# Patient Record
Sex: FEMALE | Race: WHITE | NOT HISPANIC OR LATINO
[De-identification: names, ages, dates, MRNs, and addresses within clinical notes are randomized per-mention and may not be internally consistent; named-entity substitution may affect disease eponyms.]

---

## 2017-03-08 ENCOUNTER — FORM ENCOUNTER (OUTPATIENT)
Age: 67
End: 2017-03-08

## 2017-03-09 ENCOUNTER — APPOINTMENT (OUTPATIENT)
Dept: RADIATION ONCOLOGY | Facility: CLINIC | Age: 67
End: 2017-03-09

## 2017-03-09 VITALS
RESPIRATION RATE: 17 BRPM | HEART RATE: 72 BPM | WEIGHT: 124.32 LBS | BODY MASS INDEX: 24.41 KG/M2 | SYSTOLIC BLOOD PRESSURE: 120 MMHG | DIASTOLIC BLOOD PRESSURE: 85 MMHG | OXYGEN SATURATION: 94 % | TEMPERATURE: 96.8 F | HEIGHT: 60 IN

## 2017-06-07 ENCOUNTER — FORM ENCOUNTER (OUTPATIENT)
Age: 67
End: 2017-06-07

## 2017-10-02 ENCOUNTER — APPOINTMENT (OUTPATIENT)
Dept: RADIATION ONCOLOGY | Facility: CLINIC | Age: 67
End: 2017-10-02
Payer: MEDICARE

## 2017-10-02 VITALS
OXYGEN SATURATION: 98 % | HEART RATE: 80 BPM | HEIGHT: 61 IN | WEIGHT: 133.27 LBS | SYSTOLIC BLOOD PRESSURE: 125 MMHG | BODY MASS INDEX: 25.16 KG/M2 | DIASTOLIC BLOOD PRESSURE: 85 MMHG

## 2017-10-02 DIAGNOSIS — R93.5 ABNORMAL FINDINGS ON DIAGNOSTIC IMAGING OF OTHER ABDOMINAL REGIONS, INCLUDING RETROPERITONEUM: ICD-10-CM

## 2017-10-02 PROCEDURE — 99214 OFFICE O/P EST MOD 30 MIN: CPT

## 2017-10-18 ENCOUNTER — FORM ENCOUNTER (OUTPATIENT)
Age: 67
End: 2017-10-18

## 2018-01-23 ENCOUNTER — FORM ENCOUNTER (OUTPATIENT)
Age: 68
End: 2018-01-23

## 2018-04-01 ENCOUNTER — TRANSCRIPTION ENCOUNTER (OUTPATIENT)
Age: 68
End: 2018-04-01

## 2018-04-12 ENCOUNTER — APPOINTMENT (OUTPATIENT)
Dept: RADIATION ONCOLOGY | Facility: CLINIC | Age: 68
End: 2018-04-12
Payer: MEDICARE

## 2018-04-12 VITALS
WEIGHT: 136 LBS | OXYGEN SATURATION: 98 % | DIASTOLIC BLOOD PRESSURE: 93 MMHG | RESPIRATION RATE: 16 BRPM | SYSTOLIC BLOOD PRESSURE: 142 MMHG | BODY MASS INDEX: 25.7 KG/M2 | HEART RATE: 80 BPM

## 2018-04-12 DIAGNOSIS — D18.03 HEMANGIOMA OF INTRA-ABDOMINAL STRUCTURES: ICD-10-CM

## 2018-04-12 PROCEDURE — 99213 OFFICE O/P EST LOW 20 MIN: CPT

## 2018-04-12 RX ORDER — CIPROFLOXACIN HYDROCHLORIDE 500 MG/1
500 TABLET, FILM COATED ORAL
Qty: 4 | Refills: 0 | Status: DISCONTINUED | COMMUNITY
Start: 2017-11-07

## 2018-04-12 RX ORDER — AZITHROMYCIN 250 MG/1
250 TABLET, FILM COATED ORAL
Qty: 6 | Refills: 0 | Status: DISCONTINUED | COMMUNITY
Start: 2018-04-06

## 2018-04-12 RX ORDER — MUPIROCIN 20 MG/G
2 OINTMENT TOPICAL
Qty: 22 | Refills: 0 | Status: DISCONTINUED | COMMUNITY
Start: 2018-03-22

## 2018-04-12 RX ORDER — TRIAMCINOLONE ACETONIDE 1 MG/G
0.1 OINTMENT TOPICAL
Qty: 80 | Refills: 0 | Status: DISCONTINUED | COMMUNITY
Start: 2018-04-05

## 2018-04-12 RX ORDER — DENOSUMAB 60 MG/ML
60 INJECTION SUBCUTANEOUS
Refills: 0 | Status: ACTIVE | COMMUNITY

## 2018-04-16 PROBLEM — D18.03 HEMANGIOMA OF LIVER: Status: ACTIVE | Noted: 2018-04-16

## 2018-07-31 DIAGNOSIS — Z86.79 PERSONAL HISTORY OF OTHER DISEASES OF THE CIRCULATORY SYSTEM: ICD-10-CM

## 2018-07-31 DIAGNOSIS — Z86.59 PERSONAL HISTORY OF OTHER MENTAL AND BEHAVIORAL DISORDERS: ICD-10-CM

## 2018-07-31 DIAGNOSIS — Z86.010 PERSONAL HISTORY OF COLONIC POLYPS: ICD-10-CM

## 2018-07-31 DIAGNOSIS — Z83.3 FAMILY HISTORY OF DIABETES MELLITUS: ICD-10-CM

## 2018-08-03 PROBLEM — Z86.59 HISTORY OF DEPRESSION: Status: RESOLVED | Noted: 2018-08-03 | Resolved: 2018-08-03

## 2018-08-03 PROBLEM — Z83.3 FAMILY HISTORY OF DIABETES MELLITUS: Status: ACTIVE | Noted: 2018-08-03

## 2018-08-03 PROBLEM — Z86.79 HISTORY OF BRADYCARDIA: Status: RESOLVED | Noted: 2018-08-03 | Resolved: 2018-08-03

## 2018-08-03 PROBLEM — Z86.010 HISTORY OF COLONIC POLYPS: Status: RESOLVED | Noted: 2018-08-03 | Resolved: 2018-08-03

## 2018-08-14 ENCOUNTER — FORM ENCOUNTER (OUTPATIENT)
Age: 68
End: 2018-08-14

## 2018-08-15 ENCOUNTER — RECORD ABSTRACTING (OUTPATIENT)
Age: 68
End: 2018-08-15

## 2018-08-15 ENCOUNTER — APPOINTMENT (OUTPATIENT)
Dept: GYNECOLOGIC ONCOLOGY | Facility: CLINIC | Age: 68
End: 2018-08-15

## 2018-08-24 ENCOUNTER — APPOINTMENT (OUTPATIENT)
Dept: GYNECOLOGIC ONCOLOGY | Facility: CLINIC | Age: 68
End: 2018-08-24
Payer: MEDICARE

## 2018-08-24 VITALS — WEIGHT: 130 LBS | BODY MASS INDEX: 24.55 KG/M2 | HEIGHT: 61 IN

## 2018-08-24 PROCEDURE — 99214 OFFICE O/P EST MOD 30 MIN: CPT

## 2018-08-24 RX ORDER — RALOXIFENE HYDROCHLORIDE 60 MG/1
TABLET, FILM COATED ORAL
Refills: 0 | Status: DISCONTINUED | COMMUNITY
End: 2018-08-24

## 2018-10-25 ENCOUNTER — APPOINTMENT (OUTPATIENT)
Dept: RADIATION ONCOLOGY | Facility: CLINIC | Age: 68
End: 2018-10-25
Payer: MEDICARE

## 2018-10-25 VITALS
BODY MASS INDEX: 26.35 KG/M2 | HEIGHT: 61 IN | OXYGEN SATURATION: 97 % | WEIGHT: 139.55 LBS | DIASTOLIC BLOOD PRESSURE: 80 MMHG | SYSTOLIC BLOOD PRESSURE: 121 MMHG | RESPIRATION RATE: 16 BRPM | TEMPERATURE: 98.7 F | HEART RATE: 93 BPM

## 2018-10-25 PROCEDURE — 99213 OFFICE O/P EST LOW 20 MIN: CPT

## 2018-10-25 NOTE — REVIEW OF SYSTEMS
[Urinary Frequency] : urinary frequency [Negative] : Allergic/Immunologic [Constipation: Grade 0] : Constipation: Grade 0 [Diarrhea: Grade 0] : Diarrhea: Grade 0 [Fatigue: Grade 0] : Fatigue: Grade 0 [Hematuria: Grade 0] : Hematuria: Grade 0 [Urinary Urgency: Grade 2 - Limiting instrumental ADL; medical management indicated] : Urinary Urgency: Grade 2 - Limiting instrumental ADL; medical management indicated [Genital Edema: Grade 0] : Genital Edema: Grade 0 [Vaginal Stricture: Grade 1 - Asymptomatic; mild vaginal shortening or narrowing] : Vaginal Stricture: Grade 1 - Asymptomatic; mild vaginal shortening or narrowing [Dermatitis Radiation: Grade 0] : Dermatitis Radiation: Grade 0 [Confused] : no confusion [Dizziness] : no dizziness [de-identified] : eczema recently [FreeTextEntry5] : Seeing urologist predating surgery and radiation [FreeTextEntry8] : not active

## 2018-10-25 NOTE — PHYSICAL EXAM
[General Appearance - In No Acute Distress] : in no acute distress [Abdomen Soft] : soft [Nondistended] : nondistended [Abdomen Tenderness] : non-tender [Normal External Genitalia] : normal external genitalia  [Normal Vaginal Cuff] : vaginal cuff without lesion or nodularity [Cervical Lymph Nodes Enlarged Anterior Bilaterally] : anterior cervical [Supraclavicular Lymph Nodes Enlarged Bilaterally] : supraclavicular [Inguinal Lymph Nodes Enlarged Bilaterally] : inguinal [Normal] : well developed, well nourished, in no acute distress

## 2018-10-25 NOTE — HISTORY OF PRESENT ILLNESS
[FreeTextEntry1] : Vee Mcguire is a 68 year old female with Stage IB grade2 endometrial cancer, s/p comprehensive surgical staging, treated adjuvantly with vaginal brachytherapy treated approximately 2 yrs ago.\par \par She returns today for a follow up visit. Saw Dr. Falcon in 8/2018. Today she feels well. Denies pain to pelvis.., vaginal dryness, vaginal discharge. No changes in stools.  She does report urinary urgency and is using Myrbetriq with some improvement.

## 2018-10-25 NOTE — DISEASE MANAGEMENT
[Pathological] : TNM Stage: p [IB] : IB [FreeTextEntry4] : uterine [TTNM] : 1b [NTNM] : 0 [MTNM] : 0

## 2019-02-07 ENCOUNTER — APPOINTMENT (OUTPATIENT)
Dept: GYNECOLOGIC ONCOLOGY | Facility: CLINIC | Age: 69
End: 2019-02-07
Payer: MEDICARE

## 2019-02-07 ENCOUNTER — TRANSCRIPTION ENCOUNTER (OUTPATIENT)
Age: 69
End: 2019-02-07

## 2019-02-07 VITALS
RESPIRATION RATE: 16 BRPM | OXYGEN SATURATION: 97 % | WEIGHT: 135 LBS | HEART RATE: 62 BPM | SYSTOLIC BLOOD PRESSURE: 126 MMHG | BODY MASS INDEX: 25.49 KG/M2 | HEIGHT: 61 IN | DIASTOLIC BLOOD PRESSURE: 82 MMHG

## 2019-02-07 PROCEDURE — 99213 OFFICE O/P EST LOW 20 MIN: CPT

## 2019-02-07 NOTE — HISTORY OF PRESENT ILLNESS
[FreeTextEntry1] : This 67y/o is here for a routine six month surveillance. Patient feels well from a gynecological stand point. She denies pain or VB. Patient is due for a mammogram in Feb 2019. States Dr. Burrell orders her mammograms and bone density. She is on Prolia. She had a colonoscopy a few years ago and had polyps removed. Pt is on weight watchers and is exercising.

## 2019-02-07 NOTE — REASON FOR VISIT
[FreeTextEntry1] : [facility]\par \par [facility] [phone] at [address] [place] [id number]\par

## 2019-05-02 ENCOUNTER — APPOINTMENT (OUTPATIENT)
Dept: RADIATION ONCOLOGY | Facility: CLINIC | Age: 69
End: 2019-05-02
Payer: MEDICARE

## 2019-05-02 VITALS
WEIGHT: 135.03 LBS | SYSTOLIC BLOOD PRESSURE: 124 MMHG | BODY MASS INDEX: 25.51 KG/M2 | HEART RATE: 87 BPM | OXYGEN SATURATION: 96 % | RESPIRATION RATE: 16 BRPM | DIASTOLIC BLOOD PRESSURE: 82 MMHG

## 2019-05-02 PROCEDURE — 99213 OFFICE O/P EST LOW 20 MIN: CPT

## 2019-05-02 NOTE — REVIEW OF SYSTEMS
[Anal Pain: Grade 0] : Anal Pain: Grade 0 [Constipation: Grade 0] : Constipation: Grade 0 [Diarrhea: Grade 0] : Diarrhea: Grade 0 [Fecal Incontinence: Grade 0] : Fecal Incontinence: Grade 0 [Fatigue: Grade 1 - Fatigue relieved by rest] : Fatigue: Grade 1 - Fatigue relieved by rest [Rectal Pain: Grade 0] : Rectal Pain: Grade 0 [Hematuria: Grade 0] : Hematuria: Grade 0 [Urinary Incontinence: Grade 0] : Urinary Incontinence: Grade 0  [Urinary Tract Pain: Grade 0] : Urinary Tract Pain: Grade 0 [Urinary Urgency: Grade 1 - Present] : Urinary Urgency: Grade 1 - Present [Dyspareunia: Grade 0] : Dyspareunia: Grade 0 [Skin Hyperpigmentation: Grade 0] : Skin Hyperpigmentation: Grade 0 [Dermatitis Radiation: Grade 0] : Dermatitis Radiation: Grade 0

## 2019-05-03 ENCOUNTER — TRANSCRIPTION ENCOUNTER (OUTPATIENT)
Age: 69
End: 2019-05-03

## 2019-05-13 NOTE — HISTORY OF PRESENT ILLNESS
[FreeTextEntry1] : Vee Mcguire is a 68 year old female with Stage IB grade 2 endometrial cancer, s/p comprehensive surgical staging, treated adjuvantly with vaginal brachytherapy treated approximately 3 yrs ago.\par \par She returns today for a follow up visit. Saw BHAVNA Woods on 2/7/19. Today she feels well. Denies pain to pelvis.., vaginal dryness, vaginal discharge. No changes in stools.  She continues to report urinary urgency and is using Myrbetriq with some improvement.

## 2019-05-13 NOTE — PHYSICAL EXAM
[Normal] : well developed, well nourished, in no acute distress [Abdomen Soft] : soft [] : no respiratory distress [Normal External Genitalia] : normal external genitalia  [Abdomen Tenderness] : non-tender [Normal Vaginal Cuff] : vaginal cuff without lesion or nodularity [Supraclavicular Lymph Nodes Enlarged Bilaterally] : supraclavicular [Cervical Lymph Nodes Enlarged Anterior Bilaterally] : anterior cervical [Inguinal Lymph Nodes Enlarged Bilaterally] : inguinal

## 2019-08-08 ENCOUNTER — APPOINTMENT (OUTPATIENT)
Dept: GYNECOLOGIC ONCOLOGY | Facility: CLINIC | Age: 69
End: 2019-08-08
Payer: MEDICARE

## 2019-08-08 VITALS
HEIGHT: 60 IN | HEART RATE: 88 BPM | OXYGEN SATURATION: 97 % | WEIGHT: 135 LBS | DIASTOLIC BLOOD PRESSURE: 83 MMHG | RESPIRATION RATE: 16 BRPM | SYSTOLIC BLOOD PRESSURE: 134 MMHG | BODY MASS INDEX: 26.5 KG/M2

## 2019-08-08 PROCEDURE — 99214 OFFICE O/P EST MOD 30 MIN: CPT

## 2019-08-08 RX ORDER — SIMVASTATIN 10 MG/1
10 TABLET, FILM COATED ORAL
Refills: 0 | Status: ACTIVE | COMMUNITY

## 2019-08-08 NOTE — HISTORY OF PRESENT ILLNESS
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 Landin presents for a routine six month surveillance. Patient feels well from a gynecological stand point. She denies abdominal pain or VB. Reports having a normal mammogram in Feb 2019. States Dr. Burrell orders her mammograms and bone density. She is on Prolia. She had a colonoscopy a few years ago and had polyps removed. Pt discontinued Myrbetriq due to fear of developing a cancer from it. She complains of worsening urinary leakage and urgency. Pt reports being up to date with her physical exams.

## 2019-08-08 NOTE — PHYSICAL EXAM
[Atrophy] : atrophy [Dry Mucosa] : dry mucosa [Absent] : Uterus: Absent [Normal] : Vagina: Normal [de-identified] : no palpable masses  [de-identified] : atrophic mucosa [de-identified] : Allison Jaime was present as chaperone during examination.

## 2019-08-08 NOTE — REASON FOR VISIT
[FreeTextEntry1] : [facility]\par \par [facility] [phone] at [address] [place] [phone]\par

## 2019-10-31 ENCOUNTER — APPOINTMENT (OUTPATIENT)
Dept: RADIATION ONCOLOGY | Facility: CLINIC | Age: 69
End: 2019-10-31
Payer: MEDICARE

## 2019-10-31 VITALS
BODY MASS INDEX: 27.48 KG/M2 | SYSTOLIC BLOOD PRESSURE: 129 MMHG | DIASTOLIC BLOOD PRESSURE: 86 MMHG | HEIGHT: 60 IN | HEART RATE: 70 BPM | RESPIRATION RATE: 17 BRPM | OXYGEN SATURATION: 98 % | WEIGHT: 140 LBS | TEMPERATURE: 98.1 F

## 2019-10-31 PROCEDURE — 99212 OFFICE O/P EST SF 10 MIN: CPT | Mod: GC

## 2019-10-31 NOTE — PHYSICAL EXAM
[Normal External Genitalia] : normal external genitalia  [Normal Vaginal Cuff] : vaginal cuff without lesion or nodularity [Cervical Lymph Nodes Enlarged Anterior Bilaterally] : anterior cervical [Supraclavicular Lymph Nodes Enlarged Bilaterally] : supraclavicular [Inguinal Lymph Nodes Enlarged Bilaterally] : inguinal [] : no respiratory distress [Heart Rate And Rhythm] : heart rate and rhythm were normal [Normal] : normoactive bowel sounds, soft and nontender, no hepatosplenomegaly or masses appreciated

## 2019-11-05 NOTE — REVIEW OF SYSTEMS
[Anal Pain: Grade 0] : Anal Pain: Grade 0 [Constipation: Grade 0] : Constipation: Grade 0 [Diarrhea: Grade 0] : Diarrhea: Grade 0 [Fecal Incontinence: Grade 0] : Fecal Incontinence: Grade 0 [Rectal Pain: Grade 0] : Rectal Pain: Grade 0 [Hematuria: Grade 0] : Hematuria: Grade 0 [Urinary Tract Pain: Grade 0] : Urinary Tract Pain: Grade 0 [Urinary Urgency: Grade 1 - Present] : Urinary Urgency: Grade 1 - Present [Dyspareunia: Grade 0] : Dyspareunia: Grade 0 [Skin Hyperpigmentation: Grade 0] : Skin Hyperpigmentation: Grade 0 [Dermatitis Radiation: Grade 0] : Dermatitis Radiation: Grade 0 [Fatigue: Grade 0] : Fatigue: Grade 0 [Urinary Incontinence: Grade 1 - Occasional (e.g., with coughing, sneezing, etc.), pads not indicated] : Urinary Incontinence: Grade 1 - Occasional (e.g., with coughing, sneezing, etc.), pads not indicated [Negative] : Psychiatric [FreeTextEntry8] : OAB/incontinence [FreeTextEntry2] : stress

## 2019-11-05 NOTE — HISTORY OF PRESENT ILLNESS
[FreeTextEntry1] : Vee Mcguire is a 69 year old female with Stage IB grade 2 endometrial cancer, s/p comprehensive surgical staging, treated adjuvantly with vaginal brachytherapy treated April 2016. She returns today for a follow up visit. She is feeling well. Denies any pain. Reports baseline OAB which worsened after stopping Myrbetriq. She is going to follow up with urogynecologist. No bowel complaints. No vaginal bleeding or discharge.

## 2019-11-18 ENCOUNTER — APPOINTMENT (OUTPATIENT)
Age: 69
End: 2019-11-18
Payer: MEDICARE

## 2019-11-18 ENCOUNTER — RESULT CHARGE (OUTPATIENT)
Age: 69
End: 2019-11-18

## 2019-11-18 VITALS
BODY MASS INDEX: 27.88 KG/M2 | DIASTOLIC BLOOD PRESSURE: 84 MMHG | SYSTOLIC BLOOD PRESSURE: 120 MMHG | WEIGHT: 142 LBS | HEIGHT: 60 IN

## 2019-11-18 DIAGNOSIS — Z87.39 PERSONAL HISTORY OF OTHER DISEASES OF THE MUSCULOSKELETAL SYSTEM AND CONNECTIVE TISSUE: ICD-10-CM

## 2019-11-18 DIAGNOSIS — Z85.40 PERSONAL HISTORY OF MALIGNANT NEOPLASM OF UNSPECIFIED FEMALE GENITAL ORGAN: ICD-10-CM

## 2019-11-18 DIAGNOSIS — Z86.39 PERSONAL HISTORY OF OTHER ENDOCRINE, NUTRITIONAL AND METABOLIC DISEASE: ICD-10-CM

## 2019-11-18 LAB
BILIRUB UR QL STRIP: NEGATIVE
CLARITY UR: CLEAR
COLLECTION METHOD: NORMAL
GLUCOSE UR-MCNC: NEGATIVE
HCG UR QL: 0.2 EU/DL
HGB UR QL STRIP.AUTO: NORMAL
KETONES UR-MCNC: NEGATIVE
LEUKOCYTE ESTERASE UR QL STRIP: NEGATIVE
NITRITE UR QL STRIP: NEGATIVE
PH UR STRIP: 7
PROT UR STRIP-MCNC: NEGATIVE
SP GR UR STRIP: 1.02

## 2019-11-18 PROCEDURE — 99204 OFFICE O/P NEW MOD 45 MIN: CPT | Mod: 25

## 2019-11-18 PROCEDURE — 51701 INSERT BLADDER CATHETER: CPT

## 2019-11-18 RX ORDER — MIRABEGRON 50 MG/1
50 TABLET, FILM COATED, EXTENDED RELEASE ORAL
Refills: 0 | Status: DISCONTINUED | COMMUNITY
End: 2019-11-18

## 2019-11-18 NOTE — DISCUSSION/SUMMARY
[FreeTextEntry1] : \starla Baxter presents with overactive bladder, long history, failed myrbetriq. On exam normal PVR, atrophy. We reviewed her symptoms and exam findings. We discussed management options for overactive bladder including observation, behavorial modifications and bladder training, physical therapy and medications including anticholinergics and beta 3 agonists. We discussed if behavorial modifications and medications fail proceeding with urodynamics to further evaluate her symptoms. We discussed additional treatment options including sacral neuromodulation, PTNS and intra detrusor Botox. IUGA handout on overactive bladder and bladder training PTNS was given to her. She wants to do PTNS and will return. \par \par [] u/a, culture \par [] records from Dr. Domingo\par [] PTNS

## 2019-11-18 NOTE — PHYSICAL EXAM
[No Acute Distress] : in no acute distress [Well developed] : well developed [Well Nourished] : ~L well nourished [Tenderness] : ~T no ~M abdominal tenderness observed [Labia Majora] : were normal [Labia Minora] : were normal [Vulvar Atrophy] : vulvar atrophy [Atrophy] : atrophy [2] : 2 [Absent] : absent [Adnexa Absent] : absent bilaterally [Post Void Residual ____ml] : post void residual via catheterization was [unfilled] ml [Normal] : no abnormalities [de-identified] : no prolapse  [FreeTextEntry3] : neg CST, caruncle

## 2019-11-18 NOTE — HISTORY OF PRESENT ILLNESS
[FreeTextEntry1] : \par 70 y/o reports long standing history of overactive bladder, when she has the urge to go she cannot control, she uses a pad and has to change her pants more then 1 times a day, she reports when she sits she feels like she is done and is not done, she reports leakage of urine with laughing/coughing/sneezing., during the day she voids twice in the am, she feels like she does not have the urge to go, urgency predominant leakage, she reports trying myrbetriq and then stopped, she is unsure if myrbetriq helped, she has not tried other medications, denies hematuria, denies dysuria, denies intermittent stream, denies pelvic pain, denies vaginal bulge, denies vaginal, denies constipation, \par \par she has stage I uterine cancer 4 yrs ago, s/p brachytherapy, no evidence of disease\par \par daily intake: flavored seltzer (8 ounces/day), tomato  juice, apple cider

## 2019-11-18 NOTE — OB HISTORY
[Vaginal ___] : [unfilled] vaginal delivery(s) [unknown] : the patient is unsure of the date of her LMP [Last Pap Smear ___] : date of last pap smear was on [unfilled] [Regular Cycle Intervals] : periods have been irregular [Sexually Active] : is not sexually active

## 2019-11-19 ENCOUNTER — RESULT REVIEW (OUTPATIENT)
Age: 69
End: 2019-11-19

## 2019-11-20 ENCOUNTER — RESULT REVIEW (OUTPATIENT)
Age: 69
End: 2019-11-20

## 2019-11-21 LAB
APPEARANCE: CLEAR
BACTERIA UR CULT: NORMAL
BACTERIA: NEGATIVE
BILIRUBIN URINE: NEGATIVE
BLOOD URINE: ABNORMAL
CALCIUM OXALATE CRYSTALS: ABNORMAL
COLOR: YELLOW
GLUCOSE QUALITATIVE U: NEGATIVE
HYALINE CASTS: 0 /LPF
KETONES URINE: NEGATIVE
LEUKOCYTE ESTERASE URINE: NEGATIVE
MICROSCOPIC-UA: NORMAL
NITRITE URINE: NEGATIVE
PH URINE: 6
PROTEIN URINE: NORMAL
RED BLOOD CELLS URINE: 3 /HPF
SPECIFIC GRAVITY URINE: 1.03
SQUAMOUS EPITHELIAL CELLS: 1 /HPF
URINE COMMENTS: NORMAL
UROBILINOGEN URINE: NORMAL
WHITE BLOOD CELLS URINE: 1 /HPF

## 2019-12-02 ENCOUNTER — APPOINTMENT (OUTPATIENT)
Age: 69
End: 2019-12-02
Payer: MEDICARE

## 2019-12-02 PROCEDURE — 64566 NEUROELTRD STIM POST TIBIAL: CPT

## 2019-12-09 ENCOUNTER — APPOINTMENT (OUTPATIENT)
Age: 69
End: 2019-12-09
Payer: MEDICARE

## 2019-12-09 PROCEDURE — 64566 NEUROELTRD STIM POST TIBIAL: CPT

## 2019-12-16 ENCOUNTER — RESULT CHARGE (OUTPATIENT)
Age: 69
End: 2019-12-16

## 2019-12-16 ENCOUNTER — APPOINTMENT (OUTPATIENT)
Age: 69
End: 2019-12-16
Payer: MEDICARE

## 2019-12-16 VITALS
HEIGHT: 60 IN | WEIGHT: 142 LBS | DIASTOLIC BLOOD PRESSURE: 82 MMHG | BODY MASS INDEX: 27.88 KG/M2 | SYSTOLIC BLOOD PRESSURE: 130 MMHG

## 2019-12-16 DIAGNOSIS — R31.29 OTHER MICROSCOPIC HEMATURIA: ICD-10-CM

## 2019-12-16 LAB
BILIRUB UR QL STRIP: NEGATIVE
CLARITY UR: CLEAR
COLLECTION METHOD: NORMAL
GLUCOSE UR-MCNC: NEGATIVE
HCG UR QL: 0.2 EU/DL
HGB UR QL STRIP.AUTO: NORMAL
KETONES UR-MCNC: NEGATIVE
LEUKOCYTE ESTERASE UR QL STRIP: NEGATIVE
NITRITE UR QL STRIP: NEGATIVE
PH UR STRIP: 5.5
PROT UR STRIP-MCNC: NEGATIVE
SP GR UR STRIP: 1.03

## 2019-12-16 PROCEDURE — 99213 OFFICE O/P EST LOW 20 MIN: CPT | Mod: 25

## 2019-12-16 PROCEDURE — 52000 CYSTOURETHROSCOPY: CPT

## 2019-12-23 ENCOUNTER — APPOINTMENT (OUTPATIENT)
Age: 69
End: 2019-12-23
Payer: MEDICARE

## 2019-12-23 PROCEDURE — 64566 NEUROELTRD STIM POST TIBIAL: CPT

## 2019-12-30 ENCOUNTER — APPOINTMENT (OUTPATIENT)
Age: 69
End: 2019-12-30
Payer: MEDICARE

## 2019-12-30 PROCEDURE — 64566 NEUROELTRD STIM POST TIBIAL: CPT

## 2020-01-06 ENCOUNTER — APPOINTMENT (OUTPATIENT)
Age: 70
End: 2020-01-06
Payer: MEDICARE

## 2020-01-06 PROCEDURE — 64566 NEUROELTRD STIM POST TIBIAL: CPT

## 2020-01-13 ENCOUNTER — APPOINTMENT (OUTPATIENT)
Age: 70
End: 2020-01-13
Payer: MEDICARE

## 2020-01-13 PROCEDURE — 64566 NEUROELTRD STIM POST TIBIAL: CPT

## 2020-01-20 ENCOUNTER — APPOINTMENT (OUTPATIENT)
Age: 70
End: 2020-01-20
Payer: MEDICARE

## 2020-01-20 PROCEDURE — 64566 NEUROELTRD STIM POST TIBIAL: CPT

## 2020-01-27 ENCOUNTER — APPOINTMENT (OUTPATIENT)
Age: 70
End: 2020-01-27
Payer: MEDICARE

## 2020-01-27 PROCEDURE — 64566 NEUROELTRD STIM POST TIBIAL: CPT

## 2020-02-03 ENCOUNTER — APPOINTMENT (OUTPATIENT)
Age: 70
End: 2020-02-03
Payer: MEDICARE

## 2020-02-03 ENCOUNTER — APPOINTMENT (OUTPATIENT)
Dept: GYNECOLOGIC ONCOLOGY | Facility: CLINIC | Age: 70
End: 2020-02-03
Payer: MEDICARE

## 2020-02-03 VITALS
DIASTOLIC BLOOD PRESSURE: 84 MMHG | HEART RATE: 87 BPM | OXYGEN SATURATION: 99 % | HEIGHT: 61 IN | BODY MASS INDEX: 26.81 KG/M2 | WEIGHT: 142 LBS | RESPIRATION RATE: 16 BRPM | SYSTOLIC BLOOD PRESSURE: 134 MMHG

## 2020-02-03 DIAGNOSIS — Z85.42 PERSONAL HISTORY OF MALIGNANT NEOPLASM OF OTHER PARTS OF UTERUS: ICD-10-CM

## 2020-02-03 PROCEDURE — 64566 NEUROELTRD STIM POST TIBIAL: CPT

## 2020-02-03 PROCEDURE — 99214 OFFICE O/P EST MOD 30 MIN: CPT

## 2020-02-03 NOTE — HISTORY OF PRESENT ILLNESS
[FreeTextEntry1] : This 70y/o with history of stage 1B grade 2 adenocarcinoma of the endometrium, s/p RA TLH BSO PPALND, cystoscopy PW in 2/2016 s/p vaginal cuff brachytherapy w/ Dr. Landin presents for a routine six month surveillance. Patient feels well from a gynecological stand point. She denies abdominal pain or VB. Reports having a normal mammogram in Feb 2019. States Dr. Burrell orders her mammograms and bone density. She is on Prolia. She had a colonoscopy a few years ago and had polyps removed. She has been following up with urogynecology and is receiving acupuncture for her overactive bladder which she reports is helping.

## 2020-02-03 NOTE — REASON FOR VISIT
[FreeTextEntry1] : [facility]\par \par [facility] [phone] at [address] [place] [phone]\par

## 2020-02-03 NOTE — PHYSICAL EXAM
[Absent] : Cervix: Absent [Normal] : Bimanual Exam: Normal [de-identified] : no palpable masses [de-identified] : Allison Jaime was present as chaperone during examination.

## 2020-02-04 ENCOUNTER — APPOINTMENT (OUTPATIENT)
Dept: UROGYNECOLOGY | Facility: CLINIC | Age: 70
End: 2020-02-04

## 2020-02-10 ENCOUNTER — APPOINTMENT (OUTPATIENT)
Age: 70
End: 2020-02-10
Payer: MEDICARE

## 2020-02-10 PROCEDURE — 64566 NEUROELTRD STIM POST TIBIAL: CPT

## 2020-02-12 NOTE — ASSESSMENT
I reviewed the progress note and agree with the resident’s findings and plan as documented in current encounter.    Galina Garcia MD       [No evidence of disease] : No evidence of disease

## 2020-02-17 ENCOUNTER — APPOINTMENT (OUTPATIENT)
Dept: UROGYNECOLOGY | Facility: CLINIC | Age: 70
End: 2020-02-17
Payer: MEDICARE

## 2020-02-17 PROCEDURE — 64566 NEUROELTRD STIM POST TIBIAL: CPT

## 2020-03-09 ENCOUNTER — APPOINTMENT (OUTPATIENT)
Age: 70
End: 2020-03-09
Payer: MEDICARE

## 2020-03-09 PROCEDURE — 51798 US URINE CAPACITY MEASURE: CPT

## 2020-03-09 PROCEDURE — 99213 OFFICE O/P EST LOW 20 MIN: CPT | Mod: 25

## 2020-03-09 NOTE — DISCUSSION/SUMMARY
[FreeTextEntry1] : \par Vee presents for reevaluation after 12 sessions of PTNS, she overall has 80% improvement in symptoms but does still change pants. Again discussed other options including botox vs SNS. Botox handout given to her. She will do PTNS maintenance with 1/month, and consider options. Needs UDS prior to botox. All questions were answered.\par \par [] UDS if decides botox\par [] PTNS maintenance 1/month

## 2020-03-09 NOTE — HISTORY OF PRESENT ILLNESS
[FreeTextEntry1] : \par Vee presents for f/u on OAB, she reports 80%improvement with PTNS she is overall happy, when she is emotionally upset she develops leakage, denies nocturia, but in the AM has strong urge, she has decreased coffee intake, occasionally pads are wet she does have to change her pants , she reports her symptoms are much better when she goes to the bathroom every 2 hrs, \par \par she had a CT A/P which was negative for cancer surveillance

## 2020-04-13 ENCOUNTER — APPOINTMENT (OUTPATIENT)
Dept: UROGYNECOLOGY | Facility: CLINIC | Age: 70
End: 2020-04-13

## 2020-04-23 ENCOUNTER — APPOINTMENT (OUTPATIENT)
Dept: RADIATION ONCOLOGY | Facility: CLINIC | Age: 70
End: 2020-04-23
Payer: MEDICARE

## 2020-04-23 PROCEDURE — 99441: CPT

## 2020-04-24 NOTE — REVIEW OF SYSTEMS
Patient would like to know if you can prescribe him something for th MRI.   [Negative] : Gastrointestinal [Anal Pain: Grade 0] : Anal Pain: Grade 0 [Constipation: Grade 0] : Constipation: Grade 0 [Diarrhea: Grade 0] : Diarrhea: Grade 0 [Fecal Incontinence: Grade 0] : Fecal Incontinence: Grade 0 [Rectal Pain: Grade 0] : Rectal Pain: Grade 0 [Fatigue: Grade 0] : Fatigue: Grade 0 [Urinary Tract Pain: Grade 0] : Urinary Tract Pain: Grade 0 [Hematuria: Grade 0] : Hematuria: Grade 0 [Urinary Incontinence: Grade 1 - Occasional (e.g., with coughing, sneezing, etc.), pads not indicated] : Urinary Incontinence: Grade 1 - Occasional (e.g., with coughing, sneezing, etc.), pads not indicated [Dyspareunia: Grade 0] : Dyspareunia: Grade 0 [Skin Hyperpigmentation: Grade 0] : Skin Hyperpigmentation: Grade 0 [Dermatitis Radiation: Grade 0] : Dermatitis Radiation: Grade 0 [Urinary Urgency: Grade 2 - Limiting instrumental ADL; medical management indicated] : Urinary Urgency: Grade 2 - Limiting instrumental ADL; medical management indicated [FreeTextEntry8] : OAB/incontinence [FreeTextEntry2] : stress ,DirectAddress_Unknown

## 2020-04-24 NOTE — DISEASE MANAGEMENT
[Pathological] : TNM Stage: p [IB] : IB [TTNM] : 1b [NTNM] : 0 [FreeTextEntry4] : uterine [MTNM] : 0

## 2020-04-24 NOTE — HISTORY OF PRESENT ILLNESS
[FreeTextEntry1] : Vee Mcguire is a 70 year old female with Stage IB grade 2 endometrial cancer, s/p comprehensive surgical staging, treated adjuvantly with vaginal brachytherapy treated April 2016. She returns today for a follow up visit. via telehealth. She is feeling well. Denies any pain. Reports baseline OAB which worsened after stopping Myrbetriq. She has followed up with urogynecologist and has what she describes as acupunture with some improvement.. No bowel complaints. No vaginal bleeding or discharge.\par \par CT 2/2020 : stable examination. No evidence of metastatic disease in CAP\par !0 minutes spent on phone.

## 2020-08-03 ENCOUNTER — APPOINTMENT (OUTPATIENT)
Dept: GYNECOLOGIC ONCOLOGY | Facility: CLINIC | Age: 70
End: 2020-08-03
Payer: MEDICARE

## 2020-08-03 VITALS
OXYGEN SATURATION: 96 % | HEIGHT: 61 IN | DIASTOLIC BLOOD PRESSURE: 86 MMHG | SYSTOLIC BLOOD PRESSURE: 124 MMHG | HEART RATE: 92 BPM

## 2020-08-03 PROCEDURE — 99214 OFFICE O/P EST MOD 30 MIN: CPT

## 2020-08-03 NOTE — HISTORY OF PRESENT ILLNESS
[FreeTextEntry1] : This 69y/o with history of stage 1B grade 2 adenocarcinoma of the endometrium, s/p RA TLH BSO PPALND, cystoscopy PW in 2/2016 s/p vaginal cuff brachytherapy w/ Dr. Landin presents for a routine six month surveillance. Patient feels well from a gynecological stand point. She denies abdominal pain or VB. She is due for a mammogram and will get RX from Dr. Burrell. She reports being up to date with her DEXA also ordered by Dr. Burrell. She has osteoporosis and is on Prolia. She had a colonoscopy a few years ago and had polyps removed. She plans on following up with urogynecology for overactive bladder and incontinence.  \par \par Ctscan2/2020-stable exam, no evidence of metastatic disease \par

## 2020-08-03 NOTE — REASON FOR VISIT
[FreeTextEntry1] : [facility]\par \par [facility] [phone] at [address] [place] [phone]\par

## 2020-08-03 NOTE — PHYSICAL EXAM
[Absent] : Adnexa(ae): Absent [Normal] : Parametria: Normal [de-identified] : Patient was interviewed and examined with chaperone present. Name of chaperone: Shital Guadarrama

## 2020-08-11 ENCOUNTER — APPOINTMENT (OUTPATIENT)
Age: 70
End: 2020-08-11

## 2020-08-18 ENCOUNTER — APPOINTMENT (OUTPATIENT)
Age: 70
End: 2020-08-18
Payer: MEDICARE

## 2020-08-18 PROCEDURE — 64566 NEUROELTRD STIM POST TIBIAL: CPT

## 2020-08-27 ENCOUNTER — APPOINTMENT (OUTPATIENT)
Age: 70
End: 2020-08-27

## 2020-09-01 ENCOUNTER — APPOINTMENT (OUTPATIENT)
Age: 70
End: 2020-09-01
Payer: MEDICARE

## 2020-09-01 PROCEDURE — 64566 NEUROELTRD STIM POST TIBIAL: CPT

## 2020-09-08 ENCOUNTER — APPOINTMENT (OUTPATIENT)
Age: 70
End: 2020-09-08
Payer: MEDICARE

## 2020-09-08 PROCEDURE — 64566 NEUROELTRD STIM POST TIBIAL: CPT

## 2020-09-14 ENCOUNTER — APPOINTMENT (OUTPATIENT)
Age: 70
End: 2020-09-14
Payer: MEDICARE

## 2020-09-14 ENCOUNTER — RESULT CHARGE (OUTPATIENT)
Age: 70
End: 2020-09-14

## 2020-09-14 VITALS — SYSTOLIC BLOOD PRESSURE: 127 MMHG | DIASTOLIC BLOOD PRESSURE: 82 MMHG

## 2020-09-14 LAB
BILIRUB UR QL STRIP: NEGATIVE
BILIRUB UR QL STRIP: NEGATIVE
CLARITY UR: CLEAR
CLARITY UR: CLEAR
COLLECTION METHOD: NORMAL
COLLECTION METHOD: NORMAL
GLUCOSE UR-MCNC: NEGATIVE
GLUCOSE UR-MCNC: NEGATIVE
HCG UR QL: 0.2 EU/DL
HCG UR QL: 0.2 EU/DL
HGB UR QL STRIP.AUTO: NORMAL
HGB UR QL STRIP.AUTO: NORMAL
KETONES UR-MCNC: NEGATIVE
KETONES UR-MCNC: NEGATIVE
LEUKOCYTE ESTERASE UR QL STRIP: NEGATIVE
LEUKOCYTE ESTERASE UR QL STRIP: NORMAL
NITRITE UR QL STRIP: NEGATIVE
NITRITE UR QL STRIP: NEGATIVE
PH UR STRIP: 6.5
PH UR STRIP: 7
PROT UR STRIP-MCNC: NEGATIVE
PROT UR STRIP-MCNC: NEGATIVE
SP GR UR STRIP: 1.02
SP GR UR STRIP: 1.02

## 2020-09-14 PROCEDURE — 51701 INSERT BLADDER CATHETER: CPT

## 2020-09-14 PROCEDURE — 99214 OFFICE O/P EST MOD 30 MIN: CPT | Mod: 25

## 2020-09-14 NOTE — PROCEDURE
[Urinary Tract Infection] : a urinary tract infection [Patient] : the patient [___ Fr Straight Tip] : a [unfilled] in [place]ut[place] straight tip catheter [Clear] : clear [None] : there were no complications with the catheter insertion [No Complications] : no complications [Culture] : culture [Tolerated Well] : the patient tolerated the procedure well [Post procedure instructions and information given] : Post procedure instructions and information were given and reviewed with patient. [0] : 0

## 2020-09-14 NOTE — DISCUSSION/SUMMARY
[FreeTextEntry1] : \par Vee presents for f/u during PTNS, this time she has no improvement in symptoms and would like to discuss other options. Again discussed other options including botox vs SNS, we did discuss myrbetriq and samples given to the patient incase she wants to try. . Botox handout given to her.  Needs UDS prior to botox. We reviewed her symptoms and overactive bladder including treatment options. We risks/benefits/alternatives to botox were reviewed. We discussed success rates of the procedure. We reviewed the risks of the procedure including but not limited to: UTI, recurrent UTI,  incomplete emptying requiring catheterization, failure of procedure, dysuria, hematuria, bladder pain, painful urination, injury to the bladder, systemic effects like muscle weakness. We discussed the transient effects of botox and need for future injections once effects wear off, if successful and desired by patient. In addition, she was given  information on botox to review. We discussed pre-procedure instructions including antibiotics which start 3 days prior to procedure and continue\par \par All questions were answered.\par \par [] UDS if decides botox\par

## 2020-09-14 NOTE — HISTORY OF PRESENT ILLNESS
[Cystocele (Obstetric)] : no [Vaginal Wall Prolapse] : no [Rectal Prolapse] : no [Unable To Restrain Bowel Movement] : severe [x3+] : nocturia three or more  times a night [Incomplete Emptying Of Bladder] : no [Urinary Frequency] : no [Feelings Of Urinary Urgency] : no [Pain During Urination (Dysuria)] : no [Urinary Tract Infection] : severe [] : no [Constipation Obstructed Defecation] : mild [Pelvic Pain] : no [Incomplete Emptying Of Stool] : no [Vaginal Pain] : no [FreeTextEntry1] : \par Vee presents for f/u on OAB, she initially had improvement in OAB symptoms  with PTNS and now she restarted PTNS with no improvement in symptoms, reports constantly voiding throughout the day, large leaks with urge and nocturia, she would like to discuss other options for OAB\par \par \par she had a CT A/P which was negative for cancer surveillance\par and cysto 1 yr ago which was negative

## 2020-09-14 NOTE — PHYSICAL EXAM
[Chaperone Present] : A chaperone was present in the examining room during all aspects of the physical examination [Vulvar Atrophy] : vulvar atrophy [Normal] : no abnormalities [Atrophy] : atrophy [FreeTextEntry1] : General: Well, appearing. Alert and orientated. No acute distress\par HEENT: Normocephalic, atraumatic and extraocular muscles appear to be intact \par Neck: Full range of motion, no obvious lymphadenopathy, deformities, or masses noted \par Respiratory: Speaking in full sentences comfortably, normal work of breathing and no cough during visit\par Musculoskeletal: active full range of motion in extremities \par Extremities: No upper extremity edema noted\par Skin: no obvious rash or skin lesions\par Neuro: Orientated X 3, speech is fluent, normal rate\par Psych: Normal mood and affect \par \par  [FreeTextEntry3] : caruncle

## 2020-09-15 LAB
APPEARANCE: CLEAR
BACTERIA: NEGATIVE
BILIRUBIN URINE: NEGATIVE
BLOOD URINE: NEGATIVE
COLOR: YELLOW
GLUCOSE QUALITATIVE U: NEGATIVE
HYALINE CASTS: 0 /LPF
KETONES URINE: NEGATIVE
LEUKOCYTE ESTERASE URINE: NEGATIVE
MICROSCOPIC-UA: NORMAL
NITRITE URINE: NEGATIVE
PH URINE: 6.5
PROTEIN URINE: NORMAL
RED BLOOD CELLS URINE: 5 /HPF
SPECIFIC GRAVITY URINE: 1.02
SQUAMOUS EPITHELIAL CELLS: 1 /HPF
UROBILINOGEN URINE: NORMAL
WHITE BLOOD CELLS URINE: 0 /HPF

## 2020-09-17 LAB — BACTERIA UR CULT: NORMAL

## 2020-09-30 ENCOUNTER — APPOINTMENT (OUTPATIENT)
Age: 70
End: 2020-09-30
Payer: MEDICARE

## 2020-09-30 PROCEDURE — 51784 ANAL/URINARY MUSCLE STUDY: CPT

## 2020-09-30 PROCEDURE — 51797 INTRAABDOMINAL PRESSURE TEST: CPT

## 2020-09-30 PROCEDURE — 51741 ELECTRO-UROFLOWMETRY FIRST: CPT

## 2020-09-30 PROCEDURE — 51729 CYSTOMETROGRAM W/VP&UP: CPT

## 2020-10-20 ENCOUNTER — RESULT CHARGE (OUTPATIENT)
Age: 70
End: 2020-10-20

## 2020-10-20 ENCOUNTER — APPOINTMENT (OUTPATIENT)
Dept: UROGYNECOLOGY | Facility: CLINIC | Age: 70
End: 2020-10-20
Payer: MEDICARE

## 2020-10-20 LAB
BILIRUB UR QL STRIP: NEGATIVE
CLARITY UR: CLEAR
COLLECTION METHOD: NORMAL
GLUCOSE UR-MCNC: NEGATIVE
HCG UR QL: 0.2 EU/DL
HGB UR QL STRIP.AUTO: NORMAL
KETONES UR-MCNC: NORMAL
LEUKOCYTE ESTERASE UR QL STRIP: NEGATIVE
NITRITE UR QL STRIP: NEGATIVE
PH UR STRIP: 5.5
PROT UR STRIP-MCNC: NEGATIVE
SP GR UR STRIP: 1.03

## 2020-10-20 PROCEDURE — 52287 CYSTOSCOPY CHEMODENERVATION: CPT

## 2020-11-10 ENCOUNTER — APPOINTMENT (OUTPATIENT)
Dept: RADIATION ONCOLOGY | Facility: CLINIC | Age: 70
End: 2020-11-10
Payer: MEDICARE

## 2020-11-10 VITALS
OXYGEN SATURATION: 98 % | DIASTOLIC BLOOD PRESSURE: 90 MMHG | SYSTOLIC BLOOD PRESSURE: 130 MMHG | HEART RATE: 79 BPM | RESPIRATION RATE: 16 BRPM | WEIGHT: 147.6 LBS | BODY MASS INDEX: 27.89 KG/M2 | TEMPERATURE: 96.8 F

## 2020-11-10 PROCEDURE — 99212 OFFICE O/P EST SF 10 MIN: CPT

## 2020-11-10 RX ORDER — MIRABEGRON 50 MG/1
50 TABLET, FILM COATED, EXTENDED RELEASE ORAL
Qty: 30 | Refills: 2 | Status: DISCONTINUED | COMMUNITY
Start: 2020-09-14 | End: 2020-11-10

## 2020-11-10 RX ORDER — CIPROFLOXACIN HYDROCHLORIDE 250 MG/1
250 TABLET, FILM COATED ORAL
Qty: 10 | Refills: 0 | Status: DISCONTINUED | COMMUNITY
Start: 2020-10-15 | End: 2020-11-10

## 2020-11-10 RX ORDER — MIRTAZAPINE 30 MG/1
TABLET, FILM COATED ORAL
Refills: 0 | Status: ACTIVE | COMMUNITY

## 2020-11-10 NOTE — REVIEW OF SYSTEMS
[Constipation: Grade 0] : Constipation: Grade 0 [Diarrhea: Grade 0] : Diarrhea: Grade 0 [Fatigue: Grade 0] : Fatigue: Grade 0 [Urinary Retention: Grade 0] : Urinary Retention: Grade 0 [Urinary Tract Pain: Grade 0] : Urinary Tract Pain: Grade 0 [Urinary Urgency: Grade 0] : Urinary Urgency: Grade 0 [Urinary Incontinence: Grade 2 - Spontaneous; pads indicated; limiting instrumental ADL] : Urinary Incontinence: Grade 2 - Spontaneous; pads indicated; limiting instrumental ADL [FreeTextEntry9] : OAB

## 2020-11-10 NOTE — HISTORY OF PRESENT ILLNESS
[FreeTextEntry1] : Vee Mcguire is a 70 year old female with Stage IB grade 2 endometrial cancer, s/p comprehensive surgical staging, treated adjuvantly with vaginal brachytherapy treated April 2016. She returns today for a follow up visit. via telehealth. She is feeling well. Denies any pain. Reports baseline OAB which worsened after stopping Myrbetriq. She has followed up with urogynecologist and has what she describes as acupunture with some improvement.. No bowel complaints. No vaginal bleeding or discharge.\par \par CT 2/2020 : stable examination. No evidence of metastatic disease in CAP. \par \par She underwent Botox injections for her overactive bladder which has not made asignificant change

## 2020-11-13 ENCOUNTER — APPOINTMENT (OUTPATIENT)
Dept: UROGYNECOLOGY | Facility: CLINIC | Age: 70
End: 2020-11-13
Payer: MEDICARE

## 2020-11-13 ENCOUNTER — RESULT CHARGE (OUTPATIENT)
Age: 70
End: 2020-11-13

## 2020-11-13 PROCEDURE — 99213 OFFICE O/P EST LOW 20 MIN: CPT

## 2020-11-13 NOTE — PROCEDURE
[Straight Catheterization] : insertion of a straight catheter [Urinary Tract Infection] : a urinary tract infection [Patient] : the patient [___ Fr Straight Tip] : a [unfilled] in [place] straight tip catheter [Clear] : clear [Culture] : culture [No Complications] : no complications [Tolerated Well] : the patient tolerated the procedure well [Post procedure instructions and information given] : Post procedure instructions and information were given and reviewed with patient. [0] : 0

## 2020-11-13 NOTE — PHYSICAL EXAM
[Chaperone Present] : A chaperone was present in the examining room during all aspects of the physical examination [FreeTextEntry1] : General: Well, appearing. Alert and orientated. No acute distress\par HEENT: Normocephalic, atraumatic and extraocular muscles appear to be intact \par Neck: Full range of motion, no obvious lymphadenopathy, deformities, or masses noted \par Respiratory: Speaking in full sentences comfortably, normal work of breathing and no cough during visit\par Musculoskeletal: active full range of motion in extremities \par Extremities: No upper extremity edema noted\par Skin: no obvious rash or skin lesions\par Neuro: Orientated X 3, speech is fluent, normal rate\par Psych: Normal mood and affect \par \par  [Normal] : no abnormalities [Post Void Residual ____ml] : post void residual was [unfilled] ml

## 2020-11-13 NOTE — DISCUSSION/SUMMARY
[FreeTextEntry1] : \par Vee is 3 wks s/p intradetrusor botox, no significant improvement in symptoms, will give more time, discussed hard to treat with h/o radiations, if no improvement can consider repeat injections

## 2020-11-13 NOTE — HISTORY OF PRESENT ILLNESS
[Cystocele (Obstetric)] : no [Vaginal Wall Prolapse] : no [Rectal Prolapse] : no [Unable To Restrain Bowel Movement] : moderate [Urinary Frequency More Than Twice At Night (Nocturia)] : no nocturia [Incomplete Emptying Of Bladder] : no [Urinary Frequency] : no [Feelings Of Urinary Urgency] : no [Pain During Urination (Dysuria)] : no [Urinary Tract Infection] : severe [Constipation Obstructed Defecation] : mild [] : no [Incomplete Emptying Of Stool] : no [Pelvic Pain] : no [Vaginal Pain] : no [FreeTextEntry1] : \starla Baxter presents 3 wks s/p intradetrusor botox, reports small but not significant improvement in symptoms, denies incomplete emptying, was seen by Dr. Landin

## 2021-01-06 ENCOUNTER — APPOINTMENT (OUTPATIENT)
Dept: GYNECOLOGIC ONCOLOGY | Facility: CLINIC | Age: 71
End: 2021-01-06
Payer: MEDICARE

## 2021-01-06 VITALS
HEIGHT: 61 IN | OXYGEN SATURATION: 96 % | RESPIRATION RATE: 16 BRPM | HEART RATE: 87 BPM | DIASTOLIC BLOOD PRESSURE: 80 MMHG | WEIGHT: 140 LBS | BODY MASS INDEX: 26.43 KG/M2 | SYSTOLIC BLOOD PRESSURE: 124 MMHG

## 2021-01-06 PROCEDURE — 99213 OFFICE O/P EST LOW 20 MIN: CPT

## 2021-01-06 NOTE — END OF VISIT
[FreeTextEntry3] : Written by Ivette Waggoner, acting as a scribe for Dr. Hesham Falcon.\par This note accurately reflects the work and decisions made by me\par

## 2021-01-06 NOTE — PHYSICAL EXAM
[Absent] : Adnexa(ae): Absent [Normal] : Bimanual Exam: Normal [de-identified] : Ivette Waggoner MA was present the entire time of gynecological exam.

## 2021-01-06 NOTE — REASON FOR VISIT
[FreeTextEntry1] : [facility]\par \par [facility] [phone] at [address] [place] [phone]\par

## 2021-01-06 NOTE — ASSESSMENT
[FreeTextEntry1] : Clinically GLENN\par \par Patient had questions in regards to urine incontinence in relation to her history of brachytherapy.  I explained that the brachytherapy may be why current therapies she has tried including botox are not helping. I am recommending a consultation for hyperbaric at [facility] for possible management of urine incontinence. Patient will consider and talk to her other providers. \par \par I discussed with the patient that she is now approximately five years from her gynecological cancer diagnosis.  I explained to the patient that, while the cure rate for any gynecological cancer is not 100%, the risk of her cancer recurring after five years is very small.  I explained that it is our routine for patients to be discharged from my practice at this important milestone and the importance of routine gynecological examinations with her gynecologist was discussed.  The signs and symptoms of recurrence were reviewed and the patient knows to return to see me if she has any of these or any other concerns.\par \par

## 2021-01-06 NOTE — HISTORY OF PRESENT ILLNESS
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 Landin. Patient presents today for a six month surveillance visit. Today is her 5 year visit. She feels well from a gynecological stand point. She denies abdominal pain or VB. She has osteoporosis managed with Prolia. CT from 2/2020 was GLENN. She is under the care of uro-gyn for urge incontinence and is s/p intadetrusor botox with Dr. Herr in 10/2020.

## 2021-01-11 ENCOUNTER — RESULT CHARGE (OUTPATIENT)
Age: 71
End: 2021-01-11

## 2021-01-11 ENCOUNTER — APPOINTMENT (OUTPATIENT)
Dept: UROGYNECOLOGY | Facility: CLINIC | Age: 71
End: 2021-01-11
Payer: MEDICARE

## 2021-01-11 LAB
BILIRUB UR QL STRIP: NEGATIVE
CLARITY UR: CLEAR
COLLECTION METHOD: NORMAL
GLUCOSE UR-MCNC: NEGATIVE
HCG UR QL: 0.2 EU/DL
HGB UR QL STRIP.AUTO: NORMAL
KETONES UR-MCNC: NEGATIVE
LEUKOCYTE ESTERASE UR QL STRIP: NORMAL
NITRITE UR QL STRIP: NEGATIVE
PH UR STRIP: 6
PROT UR STRIP-MCNC: NEGATIVE
SP GR UR STRIP: 1.03

## 2021-01-11 PROCEDURE — 99214 OFFICE O/P EST MOD 30 MIN: CPT

## 2021-01-11 NOTE — HISTORY OF PRESENT ILLNESS
[Cystocele (Obstetric)] : no [Vaginal Wall Prolapse] : no [Rectal Prolapse] : no [Unable To Restrain Bowel Movement] : moderate [Urinary Frequency More Than Twice At Night (Nocturia)] : no nocturia [Incomplete Emptying Of Bladder] : no [Urinary Frequency] : no [Feelings Of Urinary Urgency] : no [Pain During Urination (Dysuria)] : no [Urinary Tract Infection] : moderate [Constipation Obstructed Defecation] : mild [] : no [Incomplete Emptying Of Stool] : no [Pelvic Pain] : no [Vaginal Pain] : no [de-identified] : voids 4 times during the day  [FreeTextEntry1] : \par Vee presents for f/u on OAB and LAINE she is s/p intradetrusor botox with minimal relief 3 months ago, she has had negative cystoscopy, she is not on WW and was drinking a lot of grape juice, she reports sometimes the leakage just happens, she voids 4 times during the day, 0 times at night  , she reports leakage with laughing/coughing, she reports going through multiple clothes/day, she reports her clothes are wet \par \par UDS: LAINE/ISD and DO\par cystoscopy

## 2021-01-11 NOTE — DISCUSSION/SUMMARY
[FreeTextEntry1] : \par Vee presents with DO and LAINE/ISD. She reports leakage throughout the day, she does not have increase in frequency, no nocturia, suspect a lot of leakage is due to LAINE. Discussed options, she does not want surgery, discussed urethral bulking. Risks/benefits discussed. IUGA urethral bulking given and she would like to get it. sucesss rates reviewed. aware may need botox/bulking combination therapy to control symptoms\par \par [] coaptite

## 2021-01-11 NOTE — REASON FOR VISIT
[Follow-Up Visit_____] : a follow-up visit for [unfilled] [Questionnaire Received] : Patient questionnaire received [Intake Form Reviewed] : Patient intake form with past medical history, surgical history, family history and social history reviewed today

## 2021-02-04 ENCOUNTER — APPOINTMENT (OUTPATIENT)
Age: 71
End: 2021-02-04

## 2021-02-25 ENCOUNTER — APPOINTMENT (OUTPATIENT)
Age: 71
End: 2021-02-25
Payer: MEDICARE

## 2021-02-25 PROCEDURE — 51715 ENDOSCOPIC INJECTION/IMPLANT: CPT

## 2021-03-11 ENCOUNTER — APPOINTMENT (OUTPATIENT)
Dept: UROGYNECOLOGY | Facility: CLINIC | Age: 71
End: 2021-03-11
Payer: MEDICARE

## 2021-03-11 PROCEDURE — 51798 US URINE CAPACITY MEASURE: CPT

## 2021-03-11 PROCEDURE — 99212 OFFICE O/P EST SF 10 MIN: CPT

## 2021-03-11 NOTE — HISTORY OF PRESENT ILLNESS
[FreeTextEntry1] : Pt is happy 2 wks s/p periurethral bulking procedure.  Report 80% improvement in urine leakage.  Feels like she is emptying her bladder fully.  PVR via bladder scan= 2cc.  Will call or RTO as needed.  Verbalizes understanding.

## 2021-05-27 ENCOUNTER — APPOINTMENT (OUTPATIENT)
Dept: RADIATION ONCOLOGY | Facility: CLINIC | Age: 71
End: 2021-05-27
Payer: MEDICARE

## 2021-05-27 VITALS
RESPIRATION RATE: 17 BRPM | DIASTOLIC BLOOD PRESSURE: 87 MMHG | HEART RATE: 86 BPM | OXYGEN SATURATION: 96 % | SYSTOLIC BLOOD PRESSURE: 124 MMHG

## 2021-05-27 DIAGNOSIS — C55 MALIGNANT NEOPLASM OF UTERUS, PART UNSPECIFIED: ICD-10-CM

## 2021-05-27 PROCEDURE — 99212 OFFICE O/P EST SF 10 MIN: CPT

## 2021-06-13 NOTE — REVIEW OF SYSTEMS
[Constipation: Grade 0] : Constipation: Grade 0 [Diarrhea: Grade 0] : Diarrhea: Grade 0 [Fatigue: Grade 0] : Fatigue: Grade 0 [Urinary Incontinence: Grade 2 - Spontaneous; pads indicated; limiting instrumental ADL] : Urinary Incontinence: Grade 2 - Spontaneous; pads indicated; limiting instrumental ADL [Urinary Retention: Grade 0] : Urinary Retention: Grade 0 [Urinary Tract Pain: Grade 0] : Urinary Tract Pain: Grade 0 [Urinary Urgency: Grade 0] : Urinary Urgency: Grade 0 [FreeTextEntry9] : OAB

## 2021-06-13 NOTE — PHYSICAL EXAM
[No Rectal Mass] : no rectal mass [Normal External Genitalia] : normal external genitalia  [Normal Vaginal Cuff] : vaginal cuff without lesion or nodularity [Supraclavicular Lymph Nodes Enlarged Bilaterally] : supraclavicular [Inguinal Lymph Nodes Enlarged Bilaterally] : inguinal

## 2021-06-13 NOTE — HISTORY OF PRESENT ILLNESS
[FreeTextEntry1] : Vee Mcguire is a 71 year old female with Stage IB grade 2 endometrial cancer, s/p comprehensive surgical staging, treated adjuvantly with vaginal brachytherapy treated April 2016. She returns today for a follow up visit. via telehealth. She is feeling well. Denies any pain. Reports baseline OAB which worsened after stopping Myrbetriq. She has followed up with urogynecologist and has what she describes as acupunture with some improvement.. No bowel complaints. No vaginal bleeding or discharge.\par \par CT 2/2020 : stable examination. No evidence of metastatic disease in CAP. \par She is s/p periurethral bulking procedure with improvement in urine leakage. \par \par 5/27/21 - Pt feels well.  No complaints of pain or vaginal bleeding.  No vaginal discharge. Pt has a over active bladder bowels are functioning well.

## 2021-07-15 ENCOUNTER — APPOINTMENT (OUTPATIENT)
Age: 71
End: 2021-07-15
Payer: MEDICARE

## 2021-07-15 DIAGNOSIS — R32 UNSPECIFIED URINARY INCONTINENCE: ICD-10-CM

## 2021-07-15 PROCEDURE — L8606: CPT

## 2021-07-15 PROCEDURE — 51715 ENDOSCOPIC INJECTION/IMPLANT: CPT

## 2021-08-05 ENCOUNTER — APPOINTMENT (OUTPATIENT)
Age: 71
End: 2021-08-05
Payer: MEDICARE

## 2021-08-05 PROCEDURE — 99213 OFFICE O/P EST LOW 20 MIN: CPT

## 2021-08-05 NOTE — PHYSICAL EXAM
[Chaperone Present] : A chaperone was present in the examining room during all aspects of the physical examination [FreeTextEntry1] : General: Well, appearing. Alert and orientated. No acute distress\par HEENT: Normocephalic, atraumatic and extraocular muscles appear to be intact \par Neck: Full range of motion, no obvious lymphadenopathy, deformities, or masses noted \par Respiratory: Speaking in full sentences comfortably, normal work of breathing and no cough during visit\par Musculoskeletal: active full range of motion in extremities \par Extremities: No upper extremity edema noted\par Skin: no obvious rash or skin lesions\par Neuro: Orientated X 3, speech is fluent, normal rate\par Psych: Normal mood and affect \par \par PVR 100cc today by bladder scan 45 min after void

## 2021-08-05 NOTE — DISCUSSION/SUMMARY
[FreeTextEntry1] : \starla Vee presents for f/u on HEIDI, treatment for LAINE done, now desires repeat botox injections, will return, all questions answered.

## 2021-08-05 NOTE — HISTORY OF PRESENT ILLNESS
[FreeTextEntry1] : \starla Vee has a history of HEIDI, she has undergone both urethral bulking and botox, she has had a good response to both\par last bulking 2 wks ago and reports improvement in LAINE complaints however now reporting UUI complaints, unable to hold with urgency, frequency, she feels like the botox has wore off and thinks she needs more

## 2021-09-13 ENCOUNTER — APPOINTMENT (OUTPATIENT)
Age: 71
End: 2021-09-13
Payer: MEDICARE

## 2021-09-13 PROCEDURE — 52287 CYSTOSCOPY CHEMODENERVATION: CPT

## 2021-10-05 ENCOUNTER — RESULT CHARGE (OUTPATIENT)
Age: 71
End: 2021-10-05

## 2021-10-05 ENCOUNTER — APPOINTMENT (OUTPATIENT)
Age: 71
End: 2021-10-05
Payer: MEDICARE

## 2021-10-05 VITALS — SYSTOLIC BLOOD PRESSURE: 136 MMHG | DIASTOLIC BLOOD PRESSURE: 88 MMHG | TEMPERATURE: 98.6 F

## 2021-10-05 DIAGNOSIS — R32 UNSPECIFIED URINARY INCONTINENCE: ICD-10-CM

## 2021-10-05 LAB
BILIRUB UR QL STRIP: NORMAL
GLUCOSE UR-MCNC: NORMAL
HCG UR QL: 0.2 EU/DL
HGB UR QL STRIP.AUTO: NORMAL
KETONES UR-MCNC: NORMAL
LEUKOCYTE ESTERASE UR QL STRIP: NORMAL
NITRITE UR QL STRIP: NORMAL
PH UR STRIP: 7
PROT UR STRIP-MCNC: NORMAL
SP GR UR STRIP: 1.02

## 2021-10-05 PROCEDURE — 99212 OFFICE O/P EST SF 10 MIN: CPT

## 2021-10-05 NOTE — PHYSICAL EXAM
[No Acute Distress] : in no acute distress [Well developed] : well developed [Well Nourished] : ~L well nourished [Good Hygeine] : demonstrates good hygeine [Post Void Residual ____ml] : post void residual was [unfilled] ml

## 2021-10-07 NOTE — HISTORY OF PRESENT ILLNESS
[FreeTextEntry1] : 70 y/o female arrived today post botox injection state she has been doing well. this is Vee 2nd intradetrusor botox pt is also s/p bulking x 2. Vee denies leakage of urine with laugh cough or sneeze , + ugency , + urge incontinence.

## 2021-10-07 NOTE — DISCUSSION/SUMMARY
[FreeTextEntry1] : gurinder and i discussed timed voids , importance of setting an alarm and not waiting too long to void , she open and will try this so she does not wait too long to void then has an accident . she understands and will try . she is happy with the outcome of her injections.\par call if need arises \par call if increase in OAB symptoms

## 2021-11-12 ENCOUNTER — APPOINTMENT (OUTPATIENT)
Age: 71
End: 2021-11-12
Payer: MEDICARE

## 2021-11-12 ENCOUNTER — RESULT CHARGE (OUTPATIENT)
Age: 71
End: 2021-11-12

## 2021-11-12 DIAGNOSIS — N39.3 STRESS INCONTINENCE (FEMALE) (MALE): ICD-10-CM

## 2021-11-12 DIAGNOSIS — R35.0 FREQUENCY OF MICTURITION: ICD-10-CM

## 2021-11-12 LAB
BILIRUB UR QL STRIP: NEGATIVE
CLARITY UR: CLEAR
COLLECTION METHOD: NORMAL
GLUCOSE UR-MCNC: NEGATIVE
HCG UR QL: 0.2 EU/DL
HGB UR QL STRIP.AUTO: NORMAL
KETONES UR-MCNC: NORMAL
LEUKOCYTE ESTERASE UR QL STRIP: NEGATIVE
NITRITE UR QL STRIP: NEGATIVE
PH UR STRIP: 6
PROT UR STRIP-MCNC: NEGATIVE
SP GR UR STRIP: 1.03

## 2021-11-12 PROCEDURE — 51798 US URINE CAPACITY MEASURE: CPT

## 2021-11-12 PROCEDURE — 81003 URINALYSIS AUTO W/O SCOPE: CPT | Mod: QW

## 2021-11-12 PROCEDURE — 99213 OFFICE O/P EST LOW 20 MIN: CPT

## 2021-11-12 NOTE — HISTORY OF PRESENT ILLNESS
[FreeTextEntry1] : \par \par Vee presents for f/u on incontinence\par \par she has had bulking X 2 and botox X 2, she does have intermittent improvement of symptoms and then the leakage returns, now she feels leaking running down her leg, she does not want surgery, she wants to cont with office procedures, she is very stressed, she drinks polar seltzer but lets the bubbles go flat, she does not wear diapers, she is not using pads, she did well with PTNS and then wanted more improvement \par \par UDS with ISD/LAINE

## 2021-11-12 NOTE — DISCUSSION/SUMMARY
[FreeTextEntry1] : \starla Baxter presents for f/u on HEIDI, she does not want surgical interventions, does not want medications, discussed can repeat bulking but does not want that, she wants to restart PTNS. all questions were answered.

## 2021-12-06 ENCOUNTER — APPOINTMENT (OUTPATIENT)
Age: 71
End: 2021-12-06

## 2021-12-09 ENCOUNTER — APPOINTMENT (OUTPATIENT)
Dept: RADIATION ONCOLOGY | Facility: CLINIC | Age: 71
End: 2021-12-09
Payer: MEDICARE

## 2021-12-09 VITALS
SYSTOLIC BLOOD PRESSURE: 134 MMHG | DIASTOLIC BLOOD PRESSURE: 87 MMHG | OXYGEN SATURATION: 97 % | BODY MASS INDEX: 28.91 KG/M2 | WEIGHT: 153 LBS | HEART RATE: 77 BPM | RESPIRATION RATE: 16 BRPM

## 2021-12-09 PROCEDURE — 99212 OFFICE O/P EST SF 10 MIN: CPT | Mod: GC

## 2021-12-09 RX ORDER — SULFAMETHOXAZOLE AND TRIMETHOPRIM 800; 160 MG/1; MG/1
800-160 TABLET ORAL TWICE DAILY
Qty: 6 | Refills: 0 | Status: DISCONTINUED | COMMUNITY
Start: 2021-09-13 | End: 2021-12-09

## 2021-12-09 NOTE — VITALS
[Maximal Pain Intensity: 0/10] : 0/10 [80: Normal activity with effort; some signs or symptoms of disease.] : 80: Normal activity with effort; some signs or symptoms of disease.  shortness of breath

## 2021-12-12 ENCOUNTER — NON-APPOINTMENT (OUTPATIENT)
Age: 71
End: 2021-12-12

## 2021-12-13 ENCOUNTER — APPOINTMENT (OUTPATIENT)
Age: 71
End: 2021-12-13
Payer: MEDICARE

## 2021-12-13 PROCEDURE — 64566 NEUROELTRD STIM POST TIBIAL: CPT

## 2021-12-13 NOTE — HISTORY OF PRESENT ILLNESS
[FreeTextEntry1] : Vee Mcguire is a 71 year old female with Stage IB grade 2 endometrial cancer, s/p comprehensive surgical staging, treated adjuvantly with vaginal brachytherapy treated April 2016. She returns today for a follow up visit. via telehealth. She is feeling well. Denies any pain. Reports baseline OAB which worsened after stopping Myrbetriq. She has followed up with urogynecologist and has what she describes as acupunture with some improvement.. No bowel complaints. No vaginal bleeding or discharge.\par \par CT 2/2020 : stable examination. No evidence of metastatic disease in CAP. \par She is s/p periurethral bulking procedure with improvement in urine leakage. \par \par 5/27/21 - Pt feels well.  No complaints of pain or vaginal bleeding.  No vaginal discharge. Pt has a over active bladder bowels are functioning well.\par \par 12/9/21-  Pt feels well.  No complaints of pain or vaginal bleeding.  No vaginal discharge. Pt has a over active bladder bowels are functioning well. Has not been using the vaginal dilator.  She denies being sexually active.

## 2021-12-13 NOTE — PHYSICAL EXAM
[Normal] : oriented to person, place and time, the affect was normal, the mood was normal and not anxious [] : no respiratory distress [Abdomen Soft] : soft [Normal External Genitalia] : normal external genitalia  [Normal Vaginal Cuff] : vaginal cuff without lesion or nodularity [de-identified] : shortened vaginal canal

## 2021-12-20 ENCOUNTER — APPOINTMENT (OUTPATIENT)
Age: 71
End: 2021-12-20

## 2022-01-03 ENCOUNTER — APPOINTMENT (OUTPATIENT)
Dept: UROGYNECOLOGY | Facility: CLINIC | Age: 72
End: 2022-01-03

## 2022-01-10 ENCOUNTER — APPOINTMENT (OUTPATIENT)
Dept: UROGYNECOLOGY | Facility: CLINIC | Age: 72
End: 2022-01-10
Payer: MEDICARE

## 2022-01-10 PROCEDURE — 64566 NEUROELTRD STIM POST TIBIAL: CPT

## 2022-01-19 ENCOUNTER — APPOINTMENT (OUTPATIENT)
Dept: UROGYNECOLOGY | Facility: CLINIC | Age: 72
End: 2022-01-19
Payer: MEDICARE

## 2022-01-19 PROCEDURE — 64566 NEUROELTRD STIM POST TIBIAL: CPT

## 2022-01-24 ENCOUNTER — APPOINTMENT (OUTPATIENT)
Dept: UROGYNECOLOGY | Facility: CLINIC | Age: 72
End: 2022-01-24
Payer: MEDICARE

## 2022-01-24 PROCEDURE — 64566 NEUROELTRD STIM POST TIBIAL: CPT

## 2022-02-09 ENCOUNTER — APPOINTMENT (OUTPATIENT)
Age: 72
End: 2022-02-09
Payer: MEDICARE

## 2022-02-09 DIAGNOSIS — R39.15 URGENCY OF URINATION: ICD-10-CM

## 2022-02-09 PROCEDURE — 64566 NEUROELTRD STIM POST TIBIAL: CPT

## 2022-02-18 ENCOUNTER — APPOINTMENT (OUTPATIENT)
Age: 72
End: 2022-02-18
Payer: MEDICARE

## 2022-02-18 PROCEDURE — 64566 NEUROELTRD STIM POST TIBIAL: CPT

## 2022-02-23 ENCOUNTER — NON-APPOINTMENT (OUTPATIENT)
Age: 72
End: 2022-02-23

## 2022-02-24 ENCOUNTER — APPOINTMENT (OUTPATIENT)
Age: 72
End: 2022-02-24
Payer: MEDICARE

## 2022-02-24 PROCEDURE — 64566 NEUROELTRD STIM POST TIBIAL: CPT

## 2022-03-09 ENCOUNTER — APPOINTMENT (OUTPATIENT)
Age: 72
End: 2022-03-09
Payer: MEDICARE

## 2022-03-09 PROCEDURE — 64566 NEUROELTRD STIM POST TIBIAL: CPT

## 2022-03-14 ENCOUNTER — APPOINTMENT (OUTPATIENT)
Age: 72
End: 2022-03-14

## 2022-03-15 ENCOUNTER — APPOINTMENT (OUTPATIENT)
Dept: UROGYNECOLOGY | Facility: CLINIC | Age: 72
End: 2022-03-15

## 2022-03-16 ENCOUNTER — APPOINTMENT (OUTPATIENT)
Age: 72
End: 2022-03-16
Payer: MEDICARE

## 2022-03-16 PROCEDURE — 64566 NEUROELTRD STIM POST TIBIAL: CPT

## 2022-07-27 ENCOUNTER — APPOINTMENT (OUTPATIENT)
Dept: RADIATION ONCOLOGY | Facility: CLINIC | Age: 72
End: 2022-07-27

## 2022-07-27 VITALS
WEIGHT: 141.42 LBS | TEMPERATURE: 98.6 F | SYSTOLIC BLOOD PRESSURE: 125 MMHG | OXYGEN SATURATION: 97 % | DIASTOLIC BLOOD PRESSURE: 83 MMHG | HEART RATE: 79 BPM | RESPIRATION RATE: 17 BRPM | BODY MASS INDEX: 26.7 KG/M2 | HEIGHT: 61 IN

## 2022-07-27 PROCEDURE — 99212 OFFICE O/P EST SF 10 MIN: CPT

## 2022-07-28 NOTE — HISTORY OF PRESENT ILLNESS
[FreeTextEntry1] : Vee Mcguire is a 71 year old female with Stage IB grade 2 endometrial cancer, s/p comprehensive surgical staging, treated adjuvantly with vaginal brachytherapy treated April 2016.\par \par CT 2/2020 : stable examination. No evidence of metastatic disease in CAP. \par She is s/p periurethral bulking procedure with improvement in urine leakage. \par \par 5/27/21 - Pt feels well.  No complaints of pain or vaginal bleeding.  No vaginal discharge. Pt has a over active bladder bowels are functioning well.\par \par 12/9/21-  Pt feels well.  No complaints of pain or vaginal bleeding.  No vaginal discharge. Pt has a over active bladder bowels are functioning well. Has not been using the vaginal dilator.  She denies being sexually active.\par \par 7/27/22 - Patient presents today for routine follow up. She reports feeling overall well without any new changes to her health and no new complaints.  She reports baseline OAB is improved but she still notes day time frequency of urination. No longer on Myrbetriq and has tried accupuncture and Botox with her uroGYN (last seen 11/12/21). She currently denies fevers/chills, SOB, cough, abdominal/pelvic pain, n/v/d, vaginal bleeding/discharge. \par She and her  have relocated to [place] and return to this area to see family and visit physicians.

## 2022-07-28 NOTE — PHYSICAL EXAM
[Extraocular Movements] : extraocular movements were intact [Hearing Threshold Finger Rub Not Dunn] : hearing was normal [] : no respiratory distress [Exaggerated Use Of Accessory Muscles For Inspiration] : no accessory muscle use [Range of Motion to Joints] : range of motion to joints [Normal] : oriented to person, place and time, the affect was normal, the mood was normal and not anxious [General Appearance - In No Acute Distress] : in no acute distress [Abdomen Soft] : soft [No Rectal Mass] : no rectal mass [Normal External Genitalia] : normal external genitalia  [Normal Vaginal Cuff] : vaginal cuff without lesion or nodularity [Supraclavicular Lymph Nodes Enlarged Bilaterally] : supraclavicular [Inguinal Lymph Nodes Enlarged Bilaterally] : inguinal [de-identified] : protuberant

## 2022-07-28 NOTE — REVIEW OF SYSTEMS
[Urinary Frequency] : urinary frequency [Negative] : Psychiatric [Constipation: Grade 0] : Constipation: Grade 0 [Diarrhea: Grade 0] : Diarrhea: Grade 0 [Nausea: Grade 0] : Nausea: Grade 0 [Vomiting: Grade 0] : Vomiting: Grade 0 [Fatigue: Grade 0] : Fatigue: Grade 0 [Urinary Incontinence: Grade 0] : Urinary Incontinence: Grade 0  [Urinary Tract Pain: Grade 0] : Urinary Tract Pain: Grade 0 [Urinary Frequency: Grade 1 - Present] : Urinary Frequency: Grade 1 - Present [Nocturia] : no nocturia [Vaginal Discharge] : no vaginal discharge [Dysmenorrhea/Abn Vaginal Bleeding] : no dysmenorrhea/abnormal vaginal bleeding [FreeTextEntry8] : \ [Urinary Urgency: Grade 2 - Limiting instrumental ADL; medical management indicated] : Urinary Urgency: Grade 2 - Limiting instrumental ADL; medical management indicated

## 2023-01-09 ENCOUNTER — APPOINTMENT (OUTPATIENT)
Dept: RADIATION ONCOLOGY | Facility: CLINIC | Age: 73
End: 2023-01-09
Payer: MEDICARE

## 2023-01-09 VITALS
WEIGHT: 145.39 LBS | SYSTOLIC BLOOD PRESSURE: 130 MMHG | OXYGEN SATURATION: 97 % | RESPIRATION RATE: 17 BRPM | TEMPERATURE: 97.7 F | DIASTOLIC BLOOD PRESSURE: 86 MMHG | HEIGHT: 61 IN | HEART RATE: 91 BPM | BODY MASS INDEX: 27.45 KG/M2

## 2023-01-09 DIAGNOSIS — C54.1 MALIGNANT NEOPLASM OF ENDOMETRIUM: ICD-10-CM

## 2023-01-09 PROCEDURE — 99212 OFFICE O/P EST SF 10 MIN: CPT | Mod: GC

## 2023-01-09 NOTE — VITALS
[Maximal Pain Intensity: 0/10] : 0/10 [Least Pain Intensity: 0/10] : 0/10 [90: Able to carry normal activity; minor signs or symptoms of disease.] : 90: Able to carry normal activity; minor signs or symptoms of disease.  [NoTreatment Scheduled] : no treatment scheduled [ECOG Performance Status: 1 - Restricted in physically strenuous activity but ambulatory and able to carry out work of a light or sedentary nature] : Performance Status: 1 - Restricted in physically strenuous activity but ambulatory and able to carry out work of a light or sedentary nature, e.g., light house work, office work

## 2023-01-17 PROBLEM — C54.1 ENDOMETRIAL ADENOCARCINOMA: Status: ACTIVE | Noted: 2020-02-03

## 2023-01-17 NOTE — PHYSICAL EXAM
[General Appearance - In No Acute Distress] : in no acute distress [Extraocular Movements] : extraocular movements were intact [Hearing Threshold Finger Rub Not Wallace] : hearing was normal [] : no respiratory distress [Exaggerated Use Of Accessory Muscles For Inspiration] : no accessory muscle use [Abdomen Soft] : soft [No Rectal Mass] : no rectal mass [Normal External Genitalia] : normal external genitalia  [Normal Vaginal Cuff] : vaginal cuff without lesion or nodularity [Supraclavicular Lymph Nodes Enlarged Bilaterally] : supraclavicular [Inguinal Lymph Nodes Enlarged Bilaterally] : inguinal [Range of Motion to Joints] : range of motion to joints [Normal] : oriented to person, place and time, the affect was normal, the mood was normal and not anxious [de-identified] : protuberant

## 2023-01-17 NOTE — HISTORY OF PRESENT ILLNESS
[FreeTextEntry1] : Vee Mcguire is a 72 year old female with Stage IB grade 2 endometrial cancer, s/p comprehensive surgical staging, treated adjuvantly with vaginal brachytherapy treated April 2016.\par \par CT 2/2020 : stable examination. No evidence of metastatic disease in CAP. \par She is s/p periurethral bulking procedure with improvement in urine leakage. \par \par 5/27/21 - Pt feels well.  No complaints of pain or vaginal bleeding.  No vaginal discharge. Pt has an over active bladder and bowels are functioning well.\par \par 12/9/21-  Pt feels well.  No complaints of pain or vaginal bleeding.  No vaginal discharge. Pt has an over active bladder and bowels are functioning well. Has not been using the vaginal dilator.  She denies being sexually active.\par \par 7/27/22 - Patient presents today for routine follow up. She reports feeling overall well without any new changes to her health and no new complaints.  She reports baseline OAB is improved but she still notes day time frequency of urination. No longer on Myrbetriq and has tried accupuncture and Botox with her uroGYN (last seen 11/12/21). She currently denies fevers/chills, SOB, cough, abdominal/pelvic pain, n/v/d, vaginal bleeding/discharge. \par She and her  have relocated to [place] and return to this area to see family and visit physicians.\par \par 1/9/23 - Patient presents for routine follow up. She is doing well today. She denies any new symptoms or changes in her health. She reports baseline overactive bladder with urinary frequency and incontinence episodes. She is training her bladder and maintaining a routine of urinating every 2 hrs. She denies any vaginal bleeding/discharge, pain, fatigue or changes in bowel habits

## 2023-01-17 NOTE — REVIEW OF SYSTEMS
[Urinary Frequency] : urinary frequency [Negative] : Psychiatric [Constipation: Grade 0] : Constipation: Grade 0 [Diarrhea: Grade 0] : Diarrhea: Grade 0 [Nausea: Grade 0] : Nausea: Grade 0 [Vomiting: Grade 0] : Vomiting: Grade 0 [Fatigue: Grade 0] : Fatigue: Grade 0 [Urinary Tract Pain: Grade 0] : Urinary Tract Pain: Grade 0 [Urinary Urgency: Grade 2 - Limiting instrumental ADL; medical management indicated] : Urinary Urgency: Grade 2 - Limiting instrumental ADL; medical management indicated [Urinary Frequency: Grade 1 - Present] : Urinary Frequency: Grade 1 - Present [Urinary Incontinence: Grade 1 - Occasional (e.g., with coughing, sneezing, etc.), pads not indicated] : Urinary Incontinence: Grade 1 - Occasional (e.g., with coughing, sneezing, etc.), pads not indicated [Nocturia] : no nocturia [Vaginal Discharge] : no vaginal discharge [Dysmenorrhea/Abn Vaginal Bleeding] : no dysmenorrhea/abnormal vaginal bleeding

## 2023-06-13 ENCOUNTER — APPOINTMENT (OUTPATIENT)
Dept: RADIATION ONCOLOGY | Facility: CLINIC | Age: 73
End: 2023-06-13
Payer: MEDICARE

## 2023-06-13 VITALS
DIASTOLIC BLOOD PRESSURE: 87 MMHG | HEIGHT: 61 IN | BODY MASS INDEX: 27.85 KG/M2 | RESPIRATION RATE: 17 BRPM | HEART RATE: 97 BPM | WEIGHT: 147.49 LBS | OXYGEN SATURATION: 95 % | SYSTOLIC BLOOD PRESSURE: 131 MMHG | TEMPERATURE: 97.34 F

## 2023-06-13 DIAGNOSIS — Z08 ENCOUNTER FOR FOLLOW-UP EXAMINATION AFTER COMPLETED TREATMENT FOR MALIGNANT NEOPLASM: ICD-10-CM

## 2023-06-13 DIAGNOSIS — Z85.42 ENCOUNTER FOR FOLLOW-UP EXAMINATION AFTER COMPLETED TREATMENT FOR MALIGNANT NEOPLASM: ICD-10-CM

## 2023-06-13 DIAGNOSIS — N39.41 URGE INCONTINENCE: ICD-10-CM

## 2023-06-13 DIAGNOSIS — Z92.3 PERSONAL HISTORY OF IRRADIATION: ICD-10-CM

## 2023-06-13 PROCEDURE — 99212 OFFICE O/P EST SF 10 MIN: CPT

## 2023-06-14 PROBLEM — Z08 ENCOUNTER FOR FOLLOW-UP SURVEILLANCE OF UTERINE CANCER: Status: ACTIVE | Noted: 2023-06-14

## 2023-06-14 PROBLEM — Z92.3 HISTORY OF BRACHYTHERAPY: Status: ACTIVE | Noted: 2023-06-14

## 2023-06-14 PROBLEM — N39.41 URGE INCONTINENCE OF URINE: Status: ACTIVE | Noted: 2019-11-18

## 2023-06-14 NOTE — PHYSICAL EXAM
[General Appearance - In No Acute Distress] : in no acute distress [] : no respiratory distress [Abdomen Soft] : soft [Abdomen Tenderness] : non-tender [No Rectal Mass] : no rectal mass [Normal External Genitalia] : normal external genitalia  [Normal Vaginal Cuff] : vaginal cuff without lesion or nodularity

## 2023-06-14 NOTE — HISTORY OF PRESENT ILLNESS
[FreeTextEntry1] : Vee Mcguire is a 72 year old female with Stage IB grade 2 endometrial cancer, s/p comprehensive surgical staging, treated adjuvantly with vaginal brachytherapy treated April 2016.\par \par CT 2/2020 : stable examination. No evidence of metastatic disease in CAP. \par She is s/p periurethral bulking procedure with improvement in urine leakage. \par \par 5/27/21 - Pt feels well.  No complaints of pain or vaginal bleeding.  No vaginal discharge. Pt has an over active bladder and bowels are functioning well.\par \par 12/9/21-  Pt feels well.  No complaints of pain or vaginal bleeding.  No vaginal discharge. Pt has an over active bladder and bowels are functioning well. Has not been using the vaginal dilator.  She denies being sexually active.\par \par 7/27/22 - Patient presents today for routine follow up. She reports feeling overall well without any new changes to her health and no new complaints.  She reports baseline OAB is improved but she still notes day time frequency of urination. No longer on Myrbetriq and has tried acupuncture and Botox with her URO/GYN (last seen 11/12/21). She currently denies fevers/chills, SOB, cough, abdominal/pelvic pain, n/v/d, vaginal bleeding/discharge. \par She and her  have relocated to [place] and return to this area to see family and visit physicians.\par \par 1/9/23 - Patient presents for routine follow up. She is doing well today. She denies any new symptoms or changes in her health. She reports baseline overactive bladder with urinary frequency and incontinence episodes. She is training her bladder and maintaining a routine of urinating every 2 hrs. She denies any vaginal bleeding/discharge, pain, fatigue or changes in bowel habits\par \par 6/9/2023- Ms. Mcguire presents today in routine follow up. Living in [place], will be following up with urogyn for botox. She reports the botox worked before. Now, she toilets every two hours whether or not she feels the need.\par She reports no vaginal discharge, no bleeding.  She has no new pains.

## 2023-06-14 NOTE — REVIEW OF SYSTEMS
[Constipation: Grade 0] : Constipation: Grade 0 [Diarrhea: Grade 0] : Diarrhea: Grade 0 [Fatigue: Grade 0] : Fatigue: Grade 0 [Hematuria: Grade 0] : Hematuria: Grade 0 [Urinary Retention: Grade 0] : Urinary Retention: Grade 0 [Urinary Tract Pain: Grade 0] : Urinary Tract Pain: Grade 0 [Urinary Incontinence: Grade 2 - Spontaneous; pads indicated; limiting instrumental ADL] : Urinary Incontinence: Grade 2 - Spontaneous; pads indicated; limiting instrumental ADL [Urinary Urgency: Grade 2 - Limiting instrumental ADL; medical management indicated] : Urinary Urgency: Grade 2 - Limiting instrumental ADL; medical management indicated [Urinary Frequency: Grade 2 - Limiting instrumental ADL; medical management indicated] : Urinary Frequency: Grade 2 - Limiting instrumental ADL; medical management indicated [Genital Edema: Grade 0] : Genital Edema: Grade 0 [Vaginal Stricture: Grade 2 - Vaginal narrowing and/or shortening not interfering with physical examination] : Vaginal Stricture: Grade 2 - Vaginal narrowing and/or shortening not interfering with physical examination
